# Patient Record
Sex: MALE | Race: BLACK OR AFRICAN AMERICAN | NOT HISPANIC OR LATINO | Employment: UNEMPLOYED | URBAN - METROPOLITAN AREA
[De-identification: names, ages, dates, MRNs, and addresses within clinical notes are randomized per-mention and may not be internally consistent; named-entity substitution may affect disease eponyms.]

---

## 2022-06-27 PROBLEM — R03.0 ELEVATED BLOOD PRESSURE READING: Status: ACTIVE | Noted: 2022-06-27

## 2022-06-27 PROBLEM — R06.02 EXERTIONAL SHORTNESS OF BREATH: Status: ACTIVE | Noted: 2022-06-27

## 2022-07-20 PROBLEM — R73.03 PREDIABETES: Status: ACTIVE | Noted: 2022-07-20

## 2022-07-20 PROBLEM — M17.0 PRIMARY OSTEOARTHRITIS OF BOTH KNEES: Chronic | Status: ACTIVE | Noted: 2022-07-20

## 2022-07-20 PROBLEM — I35.1 AORTIC EJECTION MURMUR: Status: ACTIVE | Noted: 2022-07-20

## 2022-07-20 PROBLEM — R06.02 EXERTIONAL SHORTNESS OF BREATH: Chronic | Status: ACTIVE | Noted: 2022-06-27

## 2022-07-20 PROBLEM — I10 ESSENTIAL HYPERTENSION: Status: ACTIVE | Noted: 2022-07-20

## 2022-08-24 PROBLEM — Z23 NEED FOR SHINGLES VACCINE: Status: ACTIVE | Noted: 2022-08-24

## 2022-08-24 PROBLEM — E78.5 HYPERLIPIDEMIA: Status: ACTIVE | Noted: 2022-08-24

## 2022-10-10 PROBLEM — I10 ESSENTIAL HYPERTENSION: Chronic | Status: ACTIVE | Noted: 2022-07-20

## 2022-10-10 PROBLEM — R20.0 NUMBNESS OF RIGHT FOOT: Chronic | Status: ACTIVE | Noted: 2022-10-10

## 2022-12-14 PROBLEM — M25.371: Status: ACTIVE | Noted: 2022-12-14

## 2022-12-14 PROBLEM — I50.32 CHRONIC HEART FAILURE WITH PRESERVED EJECTION FRACTION: Chronic | Status: ACTIVE | Noted: 2022-12-14

## 2023-01-26 PROBLEM — K62.89 RECTAL MASS: Status: ACTIVE | Noted: 2023-01-26

## 2023-01-26 PROBLEM — K63.5 POLYP OF COLON: Status: ACTIVE | Noted: 2023-01-26

## 2023-02-09 ENCOUNTER — PES CALL (OUTPATIENT)
Dept: ADMINISTRATIVE | Facility: CLINIC | Age: 73
End: 2023-02-09

## 2023-02-14 PROBLEM — M19.071 PRIMARY OSTEOARTHRITIS OF RIGHT FOOT: Status: ACTIVE | Noted: 2023-02-14

## 2023-03-24 ENCOUNTER — TELEPHONE (OUTPATIENT)
Dept: ADMINISTRATIVE | Facility: HOSPITAL | Age: 73
End: 2023-03-24

## 2023-03-24 VITALS — SYSTOLIC BLOOD PRESSURE: 128 MMHG | DIASTOLIC BLOOD PRESSURE: 70 MMHG

## 2023-04-11 ENCOUNTER — PATIENT MESSAGE (OUTPATIENT)
Dept: RESEARCH | Facility: HOSPITAL | Age: 73
End: 2023-04-11

## 2023-04-19 PROBLEM — C20 RECTAL CANCER: Status: ACTIVE | Noted: 2023-04-19

## 2023-06-13 PROBLEM — R03.0 ELEVATED BLOOD PRESSURE READING: Status: RESOLVED | Noted: 2022-06-27 | Resolved: 2023-06-13

## 2023-06-24 PROBLEM — R91.8 MULTIPLE LUNG NODULES ON CT: Status: ACTIVE | Noted: 2023-06-24

## 2023-06-24 PROBLEM — R91.1 NODULE OF UPPER LOBE OF RIGHT LUNG: Status: ACTIVE | Noted: 2023-06-24

## 2023-06-24 PROBLEM — R91.1 NODULE OF LOWER LOBE OF LEFT LUNG: Status: ACTIVE | Noted: 2023-06-24

## 2023-11-29 ENCOUNTER — SOCIAL WORK (OUTPATIENT)
Dept: ADMINISTRATIVE | Facility: OTHER | Age: 73
End: 2023-11-29

## 2023-11-29 NOTE — PROGRESS NOTES
Received office visit from pt reporting that MD is going to decrease his Xeloda medication from 4 pills to 3 pills and he assisting with getting medication via mail. Informed that after reviewing his chart, Sw see where he was previously receiving medication from [facility] and that he must have had [employer] insurance in the past and pt reported that he did. Verified with pt that he now has [employer] HMO SNP. Informed pt that the new Xeloda Rx will have to be sent to [facility] for assistance. Pt verbalized understanding.  Pt was provided with  contact number and [facility] for further assistance when he need refills. Faxed Xeloda Rx to [phone] for assistance. Will follow up with [facility] in regards to receiving Rx.       Amita Wilson LMSW    Ext [phone]/Pager [phone]

## 2023-12-01 ENCOUNTER — SOCIAL WORK (OUTPATIENT)
Dept: ADMINISTRATIVE | Facility: OTHER | Age: 73
End: 2023-12-01

## 2023-12-01 NOTE — PROGRESS NOTES
Contacted [facility]@[phone], spoke with Rachel to follow up regarding receiving the Xeloda Rx and she confirmed that Rx was received and they received a paid claim and pt copay is $33.64. Contacted [phone] and pt's daughter(Sofie Ontiveros) and she was notified of call with [facility] representative. Informed pt daughter to call Sw if she have any issues.  Pt daughter verbalized understanding. No other needs were noted at that time. Will continue to follow pt and assist.         Amita Wilson ESTHELA    Ext [phone]/Pager [phone]

## 2023-12-07 ENCOUNTER — SOCIAL WORK (OUTPATIENT)
Dept: ADMINISTRATIVE | Facility: OTHER | Age: 73
End: 2023-12-07

## 2023-12-07 NOTE — PROGRESS NOTES
Contacted \[facility]\"" [facility]@[phone], spoke with Earline to follow up on the status of pt Capecitabine(Xeloda) medication and was told that pt received medication on 12/4/23. Contacted pt daughter(Sofie [phone]) to follow up with her in regards to pt receiving medication on 12/4/23 but was not reachable. Message was left. Will continue to follow.       Amita Wilson LMSW    Ext [phone]/Pager [phone]

## 2023-12-12 ENCOUNTER — SOCIAL WORK (OUTPATIENT)
Dept: ADMINISTRATIVE | Facility: OTHER | Age: 73
End: 2023-12-12

## 2023-12-12 NOTE — PROGRESS NOTES
Attempted to call pt daughter(Sofie [phone]) to follow up with her in regards to receiving his Xeloda medication from [facility] but was not reachable. Message was left for a return call. Will continue to follow and assist.       Amita Wilson LMSW    Ext [phone]/Pager [phone]

## 2023-12-14 ENCOUNTER — SOCIAL WORK (OUTPATIENT)
Dept: ADMINISTRATIVE | Facility: OTHER | Age: 73
End: 2023-12-14

## 2023-12-14 NOTE — PROGRESS NOTES
Received voicemail message from pt daughter(Sofie Ontiveros) informing that pt did receive his Xeloda medication from [facility]. Will continue to assist as needed.       Amita Wilson LMSW    Ext [phone]/Pager [phone]

## 2024-02-26 ENCOUNTER — PATIENT MESSAGE (OUTPATIENT)
Dept: ADMINISTRATIVE | Facility: HOSPITAL | Age: 74
End: 2024-02-26

## 2024-03-18 ENCOUNTER — PATIENT OUTREACH (OUTPATIENT)
Dept: ADMINISTRATIVE | Facility: HOSPITAL | Age: 74
End: 2024-03-18

## 2024-03-18 VITALS — SYSTOLIC BLOOD PRESSURE: 128 MMHG | DIASTOLIC BLOOD PRESSURE: 87 MMHG

## 2024-04-12 PROBLEM — Z12.11 ENCOUNTER FOR SCREENING COLONOSCOPY: Status: ACTIVE | Noted: 2024-04-12
